# Patient Record
Sex: MALE | Race: WHITE | NOT HISPANIC OR LATINO | Employment: FULL TIME | URBAN - METROPOLITAN AREA
[De-identification: names, ages, dates, MRNs, and addresses within clinical notes are randomized per-mention and may not be internally consistent; named-entity substitution may affect disease eponyms.]

---

## 2021-05-31 ENCOUNTER — RECORDS - HEALTHEAST (OUTPATIENT)
Dept: ADMINISTRATIVE | Facility: CLINIC | Age: 46
End: 2021-05-31

## 2023-11-16 ENCOUNTER — OFFICE VISIT (OUTPATIENT)
Dept: URGENT CARE | Facility: URGENT CARE | Age: 48
End: 2023-11-16
Payer: COMMERCIAL

## 2023-11-16 VITALS
OXYGEN SATURATION: 97 % | WEIGHT: 195 LBS | HEART RATE: 74 BPM | DIASTOLIC BLOOD PRESSURE: 88 MMHG | TEMPERATURE: 98.8 F | HEIGHT: 73 IN | BODY MASS INDEX: 25.84 KG/M2 | SYSTOLIC BLOOD PRESSURE: 130 MMHG

## 2023-11-16 DIAGNOSIS — J01.90 ACUTE NON-RECURRENT SINUSITIS, UNSPECIFIED LOCATION: Primary | ICD-10-CM

## 2023-11-16 PROCEDURE — 99203 OFFICE O/P NEW LOW 30 MIN: CPT | Performed by: PHYSICIAN ASSISTANT

## 2023-11-16 RX ORDER — METHYLPREDNISOLONE 4 MG
TABLET, DOSE PACK ORAL
Qty: 21 TABLET | Refills: 0 | Status: SHIPPED | OUTPATIENT
Start: 2023-11-16

## 2023-11-16 RX ORDER — GUAIFENESIN 600 MG/1
1200 TABLET, EXTENDED RELEASE ORAL 2 TIMES DAILY
Qty: 40 TABLET | Refills: 0 | Status: SHIPPED | OUTPATIENT
Start: 2023-11-16 | End: 2023-11-26

## 2023-11-16 RX ORDER — SACCHAROMYCES BOULARDII 250 MG
250 CAPSULE ORAL 2 TIMES DAILY
Qty: 28 CAPSULE | Refills: 0 | Status: SHIPPED | OUTPATIENT
Start: 2023-11-16 | End: 2023-11-30

## 2023-11-16 RX ORDER — FLUTICASONE PROPIONATE 50 MCG
1 SPRAY, SUSPENSION (ML) NASAL DAILY
Qty: 15.8 ML | Refills: 0 | Status: SHIPPED | OUTPATIENT
Start: 2023-11-16

## 2023-11-17 NOTE — PATIENT INSTRUCTIONS
1.  Plenty of fluids, rest, warm compresses on face  2.  Mucinex twice daily for at least 4 days  3.  Prachi Pot 1x in the morning 1x at night (SALINE MIST SPRAY IS AN ACCEPTABLE, THOUGH NOT AS EFFECTIVE REPLACEMENT)  4.  Benadryl (diphenhydramine) at bedtime   5.  Either Claritin (Loratadine), Allegra (Fexofenadine), or Zyrtec (Cetirizine) in the day  6.  Flonase (Fluticasone) 2x each nostril twice a day for two weeks, then once each nostril once a day    If focal symptoms occur, symptoms aren't improving at day 10:  7. Antibioitic  8. Probiotic 2 hours after each antibiotic dose       Please let us know if symptoms persist, or worsen.

## 2023-11-17 NOTE — PROGRESS NOTES
"SUBJECTIVE:   Dev Gallagher is a 48 year old male presenting with a chief complaint of runny nose, stuffy nose, facial pain/pressure, headache, and body aches.  Onset of symptoms was 4 day(s) ago.  Course of illness is worsening.    Severity moderate  Current and Associated symptoms: as above  Predisposing factors include sinusitis persisting for a few weeks    No past medical history on file.  Current Outpatient Medications   Medication Sig Dispense Refill    Ca Carbonate-Mag Hydroxide (ROLAIDS PO)  (Patient not taking: Reported on 11/16/2023)      NO ACTIVE MEDICATIONS  (Patient not taking: Reported on 11/16/2023)       Social History     Tobacco Use    Smoking status: Never    Smokeless tobacco: Never   Substance Use Topics    Alcohol use: Not on file     Comment: twice per week       ROS:  Review of systems negative except as stated above.    OBJECTIVE:  /88   Pulse 74   Temp 98.8  F (37.1  C) (Temporal)   Ht 1.854 m (6' 1\")   Wt 88.5 kg (195 lb)   SpO2 97%   BMI 25.73 kg/m    GENERAL APPEARANCE: healthy, alert and no distress  EYES: conjunctiva clear  HENT: ear canals and TM's normal.  Nose and mouth without ulcers, erythema or lesions  NECK: supple, nontender, no lymphadenopathy  RESP: lungs clear to auscultation - no rales, rhonchi or wheezes  CV: regular rates and rhythm, normal S1 S2, no murmur noted  NEURO: Normal strength and tone, sensory exam grossly normal,  normal speech and mentation  SKIN: no suspicious lesions or rashes      No results found for any visits on 11/16/23.    ASSESSMENT:  (J01.90) Acute non-recurrent sinusitis, unspecified location  (primary encounter diagnosis)  Comment: will cover for bacteria   Plan: methylPREDNISolone (MEDROL DOSEPAK) 4 MG tablet        therapy pack, fluticasone (FLONASE) 50 MCG/ACT         nasal spray, guaiFENesin (MUCINEX) 600 MG 12 hr        tablet, amoxicillin-clavulanate (AUGMENTIN)         875-125 MG tablet, saccharomyces boulardii         " (FLORASTOR) 250 MG capsule          Patient Instructions   1.  Plenty of fluids, rest, warm compresses on face  2.  Mucinex twice daily for at least 4 days  3.  Prachi Pot 1x in the morning 1x at night (SALINE MIST SPRAY IS AN ACCEPTABLE, THOUGH NOT AS EFFECTIVE REPLACEMENT)  4.  Benadryl (diphenhydramine) at bedtime   5.  Either Claritin (Loratadine), Allegra (Fexofenadine), or Zyrtec (Cetirizine) in the day  6.  Flonase (Fluticasone) 2x each nostril twice a day for two weeks, then once each nostril once a day    If focal symptoms occur, symptoms aren't improving at day 10:  7. Antibioitic  8. Probiotic 2 hours after each antibiotic dose       Please let us know if symptoms persist, or worsen.

## 2024-08-01 ENCOUNTER — OFFICE VISIT (OUTPATIENT)
Dept: URGENT CARE | Facility: URGENT CARE | Age: 49
End: 2024-08-01
Payer: COMMERCIAL

## 2024-08-01 VITALS
OXYGEN SATURATION: 100 % | BODY MASS INDEX: 24.38 KG/M2 | TEMPERATURE: 98.2 F | DIASTOLIC BLOOD PRESSURE: 93 MMHG | SYSTOLIC BLOOD PRESSURE: 142 MMHG | HEART RATE: 65 BPM | HEIGHT: 74 IN | WEIGHT: 190 LBS

## 2024-08-01 DIAGNOSIS — H92.01 EARACHE ON RIGHT: Primary | ICD-10-CM

## 2024-08-01 PROCEDURE — 99213 OFFICE O/P EST LOW 20 MIN: CPT | Performed by: PHYSICIAN ASSISTANT

## 2024-08-01 NOTE — PROGRESS NOTES
"URGENT CARE VISIT:    SUBJECTIVE:   Dev Gallagher is a 49 year old male presenting with a chief complaint of ear pain right.  Onset was 2 day(s) ago.   He denies the following symptoms: fever, stuffy nose, and cough - productive  Course of illness is same.    Treatment measures tried include None tried with no relief of symptoms.  Predisposing factors include none.    PMH: No past medical history on file.  Allergies: Compazine   Medications:   Current Outpatient Medications   Medication Sig Dispense Refill    NO ACTIVE MEDICATIONS       Ca Carbonate-Mag Hydroxide (ROLAIDS PO)  (Patient not taking: Reported on 11/16/2023)      fluticasone (FLONASE) 50 MCG/ACT nasal spray Spray 1 spray into both nostrils daily (Patient not taking: Reported on 8/1/2024) 15.8 mL 0    methylPREDNISolone (MEDROL DOSEPAK) 4 MG tablet therapy pack Follow Package Directions (Patient not taking: Reported on 8/1/2024) 21 tablet 0     Social History:   Social History     Tobacco Use    Smoking status: Never    Smokeless tobacco: Never   Substance Use Topics    Alcohol use: Not on file     Comment: twice per week       ROS:  Review of systems negative except as stated above.    OBJECTIVE:  BP (!) 142/93   Pulse 65   Temp 98.2  F (36.8  C) (Oral)   Ht 1.88 m (6' 2\")   Wt 86.2 kg (190 lb)   SpO2 100%   BMI 24.39 kg/m    GENERAL APPEARANCE: healthy, alert and no distress  EYES: EOMI,  PERRL, conjunctiva clear  HENT: ear canals and TM's normal.  Nose and mouth without ulcers, erythema or lesions  NECK: supple, nontender, no lymphadenopathy  RESP: lungs clear to auscultation - no rales, rhonchi or wheezes  CV: regular rates and rhythm, normal S1 S2, no murmur noted  SKIN: no suspicious lesions or rashes      ASSESSMENT:    ICD-10-CM    1. Earache on right  H92.01           PLAN:  Patient Instructions   Patient was educated on the natural course of condition. Trial of Zyrtec. Conservative measures discussed including over-the-counter " analgesics. See your primary care provider if symptoms do not improve in 2 weeks. Seek emergency care if you develop severe ear pain, swelling, or redness.     Patient verbalized understanding and is agreeable to plan. The patient was discharged ambulatory and in stable condition.    Lula Nicole PA-C ....................  8/1/2024   11:25 AM

## 2024-08-01 NOTE — PATIENT INSTRUCTIONS
Patient was educated on the natural course of condition. Trial of Zyrtec. Conservative measures discussed including over-the-counter analgesics. See your primary care provider if symptoms do not improve in 2 weeks. Seek emergency care if you develop severe ear pain, swelling, or redness.

## 2025-04-16 ENCOUNTER — OFFICE VISIT (OUTPATIENT)
Dept: URGENT CARE | Facility: URGENT CARE | Age: 50
End: 2025-04-16
Payer: COMMERCIAL

## 2025-04-16 VITALS
RESPIRATION RATE: 18 BRPM | WEIGHT: 199 LBS | DIASTOLIC BLOOD PRESSURE: 92 MMHG | TEMPERATURE: 98.1 F | SYSTOLIC BLOOD PRESSURE: 136 MMHG | HEART RATE: 76 BPM | BODY MASS INDEX: 25.55 KG/M2 | OXYGEN SATURATION: 97 %

## 2025-04-16 DIAGNOSIS — J01.90 ACUTE SINUSITIS WITH SYMPTOMS > 10 DAYS: Primary | ICD-10-CM

## 2025-04-16 PROCEDURE — 99213 OFFICE O/P EST LOW 20 MIN: CPT | Performed by: INTERNAL MEDICINE

## 2025-04-16 PROCEDURE — 3080F DIAST BP >= 90 MM HG: CPT | Performed by: INTERNAL MEDICINE

## 2025-04-16 PROCEDURE — 3075F SYST BP GE 130 - 139MM HG: CPT | Performed by: INTERNAL MEDICINE

## 2025-04-16 RX ORDER — FLUTICASONE PROPIONATE 50 MCG
1 SPRAY, SUSPENSION (ML) NASAL 2 TIMES DAILY
Qty: 16 G | Refills: 0 | Status: SHIPPED | OUTPATIENT
Start: 2025-04-16 | End: 2025-04-26

## 2025-04-16 NOTE — CONFIDENTIAL NOTE
Urgent Care Clinic Visit    Chief Complaint   Patient presents with    Urgent Care     For about a week now- Fever, fatigue, dizziness, coughing. Possible sinus infection. Left ear pain. Nyquil and aleve at night.               4/16/2025     2:23 PM   Additional Questions   Roomed by Chavez HAYDEN   Accompanied by Self

## 2025-04-16 NOTE — PROGRESS NOTES
ASSESSMENT AND PLAN:      ICD-10-CM    1. Acute sinusitis with symptoms > 10 days  J01.90 fluticasone (FLONASE) 50 MCG/ACT nasal spray     amoxicillin-clavulanate (AUGMENTIN) 875-125 MG tablet        Patient Instructions   Flonase 2 x day for 10 days  Augmentin 2 x day 1 week.    Probiotics/yogurt.            Kathleen Rose MD  [facility]    Subjective     Dev Gallagher is a 49 year old who presents for Patient presents with:  Urgent Care: For about a week now- Fever, fatigue, dizziness, coughing. Possible sinus infection. Left ear pain. Nyquil and aleve at night.     an established patient of [facility].    UR Adult  Patient is here today with concerns of 10 days symptoms,   Started in chest seemed to improve until past few days settled in the sinuses  Fatigue, facial pressure, left ear pain, cough and dizziness   decreased hearing      Treatment measures tried include OTC Cough med.  Predisposing factors include ill contact: Family member .      Review of Systems        Objective    BP (!) 136/92 (BP Location: Right arm, Patient Position: Sitting, Cuff Size: Adult Large)   Pulse 76   Temp 98.1  F (36.7  C) (Oral)   Resp 18   Wt 90.3 kg (199 lb)   SpO2 97%   BMI 25.55 kg/m    Physical Exam  Vitals reviewed.   Constitutional:       Appearance: Normal appearance.   HENT:      Right Ear: Tympanic membrane normal.      Left Ear: Tympanic membrane normal.      Nose: Nose normal.      Mouth/Throat:      Mouth: Mucous membranes are moist.      Pharynx: Oropharynx is clear.   Cardiovascular:      Rate and Rhythm: Normal rate and regular rhythm.      Pulses: Normal pulses.      Heart sounds: Normal heart sounds.   Pulmonary:      Effort: Pulmonary effort is normal.      Breath sounds: Normal breath sounds.   Neurological:      Mental Status: He is alert.